# Patient Record
Sex: MALE | Race: WHITE | NOT HISPANIC OR LATINO | Employment: FULL TIME | URBAN - METROPOLITAN AREA
[De-identification: names, ages, dates, MRNs, and addresses within clinical notes are randomized per-mention and may not be internally consistent; named-entity substitution may affect disease eponyms.]

---

## 2017-01-28 ENCOUNTER — PATIENT MESSAGE (OUTPATIENT)
Dept: RESEARCH | Facility: HOSPITAL | Age: 32
End: 2017-01-28

## 2019-05-16 ENCOUNTER — OFFICE VISIT (OUTPATIENT)
Dept: INTERNAL MEDICINE | Facility: CLINIC | Age: 34
End: 2019-05-16
Payer: COMMERCIAL

## 2019-05-16 VITALS
SYSTOLIC BLOOD PRESSURE: 118 MMHG | HEART RATE: 101 BPM | DIASTOLIC BLOOD PRESSURE: 80 MMHG | HEIGHT: 73 IN | BODY MASS INDEX: 26.38 KG/M2 | OXYGEN SATURATION: 99 % | WEIGHT: 199.06 LBS

## 2019-05-16 DIAGNOSIS — Z13.220 ENCOUNTER FOR LIPID SCREENING FOR CARDIOVASCULAR DISEASE: ICD-10-CM

## 2019-05-16 DIAGNOSIS — Z23 NEED FOR TETANUS, DIPHTHERIA, AND ACELLULAR PERTUSSIS (TDAP) VACCINE: ICD-10-CM

## 2019-05-16 DIAGNOSIS — H93.13 TINNITUS OF BOTH EARS: ICD-10-CM

## 2019-05-16 DIAGNOSIS — C44.519 BASAL CELL CARCINOMA (BCC) OF ANTERIOR CHEST: ICD-10-CM

## 2019-05-16 DIAGNOSIS — Z13.6 ENCOUNTER FOR LIPID SCREENING FOR CARDIOVASCULAR DISEASE: ICD-10-CM

## 2019-05-16 DIAGNOSIS — H43.393 VITREOUS FLOATERS OF BOTH EYES: ICD-10-CM

## 2019-05-16 DIAGNOSIS — J45.909 CHILDHOOD ASTHMA WITHOUT COMPLICATION, UNSPECIFIED ASTHMA SEVERITY, UNSPECIFIED WHETHER PERSISTENT: ICD-10-CM

## 2019-05-16 DIAGNOSIS — Z00.00 ANNUAL PHYSICAL EXAM: Primary | ICD-10-CM

## 2019-05-16 DIAGNOSIS — K21.9 GASTROESOPHAGEAL REFLUX DISEASE, ESOPHAGITIS PRESENCE NOT SPECIFIED: ICD-10-CM

## 2019-05-16 DIAGNOSIS — Z13.1 ENCOUNTER FOR SCREENING FOR DIABETES MELLITUS: ICD-10-CM

## 2019-05-16 DIAGNOSIS — Z13.29 SCREENING FOR THYROID DISORDER: ICD-10-CM

## 2019-05-16 PROCEDURE — 99999 PR PBB SHADOW E&M-EST. PATIENT-LVL III: ICD-10-PCS | Mod: PBBFAC,,, | Performed by: INTERNAL MEDICINE

## 2019-05-16 PROCEDURE — 99999 PR PBB SHADOW E&M-EST. PATIENT-LVL III: CPT | Mod: PBBFAC,,, | Performed by: INTERNAL MEDICINE

## 2019-05-16 PROCEDURE — 90715 TDAP VACCINE GREATER THAN OR EQUAL TO 7YO IM: ICD-10-PCS | Mod: S$GLB,,, | Performed by: INTERNAL MEDICINE

## 2019-05-16 PROCEDURE — 99385 PREV VISIT NEW AGE 18-39: CPT | Mod: 25,S$GLB,, | Performed by: INTERNAL MEDICINE

## 2019-05-16 PROCEDURE — 90471 IMMUNIZATION ADMIN: CPT | Mod: S$GLB,,, | Performed by: INTERNAL MEDICINE

## 2019-05-16 PROCEDURE — 90471 TDAP VACCINE GREATER THAN OR EQUAL TO 7YO IM: ICD-10-PCS | Mod: S$GLB,,, | Performed by: INTERNAL MEDICINE

## 2019-05-16 PROCEDURE — 99385 PR PREVENTIVE VISIT,NEW,18-39: ICD-10-PCS | Mod: 25,S$GLB,, | Performed by: INTERNAL MEDICINE

## 2019-05-16 PROCEDURE — 90715 TDAP VACCINE 7 YRS/> IM: CPT | Mod: S$GLB,,, | Performed by: INTERNAL MEDICINE

## 2019-05-16 NOTE — PROGRESS NOTES
Subjective:       Patient ID: Vimal Keys is a 33 y.o. male who  has a past medical history of Anxiety, Basal cell carcinoma (BCC) of anterior chest, Childhood asthma, Depression, and GERD (gastroesophageal reflux disease).    Chief Complaint: Establish Care     History was obtained from the patient and supplemented through chart review.  The patient has not seen a PCP in our system.  Reviewed admission for left inguinal hernia repair.    From [place].  Graduated in 2009.  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  Residency at [facility].  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  Wife is a pediatric gastroenterologist at [employer] (met in med school).    HPI    Non allergic rhinitis, tinnitus:  Viral infection that resulted in dimished sense of smell which is slowing coming back. Underwent nasal endoscopy 2016 without polyps.  Will see ENT for tinnitus.  No vertigo, falls.  Plans for audiogram.  On Ginko Biloba    Eyes Floaters :  Will see retina specialist.  S/p ppx laser.  Under consideration for victrectomy.    GERD:  Long history since about age 16.  On PPI intermittently.  Uncle with Vasquez's esophagus and adenocarcinoma of the esophagus.  EGD in 2015 with small hernia.  No esophagitis.  Pathology without Vasquez's/malignancy.  Sx are much better now.    Childhood asthma:  No acute issues.  Does not require rescue inhaler.    Basal cell Ca of anterior chest:  S/p imiquimod.  Follows with outside dermatology for annual exams.    Review of Systems   Constitutional: Negative for fever and unexpected weight change.   HENT: Negative for rhinorrhea and sneezing.    Eyes: Negative for redness and itching.   Respiratory: Negative for shortness of breath and wheezing.    Cardiovascular: Negative for chest pain and palpitations.   Gastrointestinal: Negative for abdominal pain and blood in stool.   Genitourinary: Negative for dysuria and hematuria.   Musculoskeletal: Negative for gait problem and joint swelling.   Skin:  Negative for color change and rash.   Neurological: Negative for dizziness and light-headedness.   Hematological: Negative for adenopathy.   Psychiatric/Behavioral: Negative for dysphoric mood. The patient is not nervous/anxious.        Past Medical History:   Diagnosis Date    Anxiety     not often    Basal cell carcinoma (BCC) of anterior chest     Childhood asthma     Depression     GERD (gastroesophageal reflux disease)      Past Surgical History:   Procedure Laterality Date    ESOPHAGOGASTRODUODENOSCOPY (EGD) N/A 11/19/2015    [facility] (4TH FLR)    ITNWKADSESR-PEKDPDAW-EPOCKJSDXMKZ-RIGHT Right 12/2/2016    [facility] Bruce [facility] at [facility] OR 2ND FLR    eye procedure      HERNIA REPAIR  2016    Lap LI with mesh    REPAIR-HERNIA-LAPAROSCOPIC-INGUINAL-LEFT w/ mesh Left 12/2/2016    Performed by [facility] at [facility] OR 2ND FLR     Family History   Problem Relation Age of Onset    Esophageal cancer Paternal Uncle         Adenocarcinoma    Vasquez's esophagus Paternal Uncle     No Known Problems Mother     Other Father         prediabetes    Heart attack Neg Hx     Diabetes Neg Hx     Hypertension Neg Hx      Social History     Socioeconomic History    Marital status:      Spouse name: Not on file    Number of children: Not on file    Years of education: Not on file    Highest education level: Not on file   Occupational History    Not on file   Social Needs    Financial resource strain: Not on file    Food insecurity:     Worry: Not on file     Inability: Not on file    Transportation needs:     Medical: Not on file     Non-medical: Not on file   Tobacco Use    Smoking status: Never Smoker    Smokeless tobacco: Never Used   Substance and Sexual Activity    Alcohol use: Yes     Alcohol/week: 5.4 oz     Types: 3 Glasses of wine, 3 Cans of beer, 3 Shots of liquor per week     Comment: social    Drug use: No    Sexual  "activity: Not on file   Lifestyle    Physical activity:     Days per week: Not on file     Minutes per session: Not on file    Stress: Not on file   Relationships    Social connections:     Talks on phone: Not on file     Gets together: Not on file     Attends [employer] service: Not on file     Active member of club or organization: Not on file     Attends meetings of clubs or organizations: Not on file     Relationship status: Not on file   Other Topics Concern    Not on file   Social History Narrative    Not on file     Objective:      Vitals:    05/16/19 1340   BP: 118/80   Pulse: 101   SpO2: 99%   Weight: 90.3 kg (199 lb 1.2 oz)   Height: 6' 1" (1.854 m)      Physical Exam   Constitutional: He appears well-developed and well-nourished. No distress.   HENT:   Head: Normocephalic and atraumatic.   Nose: Nose normal.   Mouth/Throat: Oropharynx is clear and moist. No oropharyngeal exudate.   Eyes: Pupils are equal, round, and reactive to light. Conjunctivae and EOM are normal. Right eye exhibits no discharge. Left eye exhibits no discharge. No scleral icterus.   Neck: Neck supple. No tracheal deviation present. No thyromegaly present.   Cardiovascular: Normal rate, regular rhythm, normal heart sounds and intact distal pulses.   No murmur heard.  Pulmonary/Chest: Effort normal and breath sounds normal. No respiratory distress. He has no wheezes.   Abdominal: Soft. Bowel sounds are normal. He exhibits no distension. There is no tenderness.   Musculoskeletal: He exhibits no edema or deformity.   Lymphadenopathy:     He has no cervical adenopathy.   Neurological: He is alert. No cranial nerve deficit. Gait normal.   Skin: Skin is warm and dry. Capillary refill takes less than 2 seconds. He is not diaphoretic. No erythema.   Psychiatric: He has a normal mood and affect. His behavior is normal.         Lab Results   Component Value Date    WBC 5.22 11/30/2016    HGB 13.8 (L) 11/30/2016    HCT 41.8 11/30/2016    PLT " 217 11/30/2016     11/30/2016    K 4.1 11/30/2016     11/30/2016    CREATININE 1.1 11/30/2016    BUN 15 11/30/2016    CO2 27 11/30/2016       The ASCVD Risk score (Keisha TOSCANO Jr., et al., 2013) failed to calculate for the following reasons:    The 2013 ASCVD risk score is only valid for ages 40 to 79    (Imaging have been independently reviewed)  none    Assessment:       1. Annual physical exam    2. Tinnitus of both ears    3. Vitreous floaters of both eyes    4. Gastroesophageal reflux disease, esophagitis presence not specified    5. Childhood asthma without complication, unspecified asthma severity, unspecified whether persistent    6. Basal cell carcinoma (BCC) of anterior chest    7. Encounter for lipid screening for cardiovascular disease    8. Encounter for screening for diabetes mellitus    9. Screening for thyroid disorder    10. Need for tetanus, diphtheria, and acellular pertussis (Tdap) vaccine          Plan:       Vimal was seen today for establish care.    Diagnoses and all orders for this visit:    Annual physical exam  -     CBC auto differential; Future  -     Comprehensive metabolic panel; Future    Tinnitus of both ears  Comments:  Will see ENT with plans for audiogram.    Vitreous floaters of both eyes  Comments:  s/p laser for ppx.  Will see retina specialist.    Gastroesophageal reflux disease, esophagitis presence not specified  Comments:  EGD 2015 without esophagitis, pathology without malignancy or Vasquez's.  On PPI intermittently with improvement of symptoms.    Childhood asthma without complication, unspecified asthma severity, unspecified whether persistent  Comments:  No acute issues.    Basal cell carcinoma (BCC) of anterior chest  Comments:  Follows with Dermatology for routine skin exams.    Encounter for lipid screening for cardiovascular disease  -     Lipid panel; Future    Encounter for screening for diabetes mellitus  -     Hemoglobin A1c; Future    Screening for  thyroid disorder  -     TSH; Future    Need for tetanus, diphtheria, and acellular pertussis (Tdap) vaccine  -     (In Office Administered) Tdap Vaccine         Side effects of medication(s) were discussed in detail and patient voiced understanding.  Patient will call back for any issues or complications.     RTC in 1 year(s) or sooner PRN.

## 2019-05-16 NOTE — PROGRESS NOTES
"Patient was given vaccine information sheet for the Tdap immunization. The area of injection was palpated using the acromion process as a landmark. This area was cleaned with alcohol. Using a 25g 1" safety needle, 0.5mL of the vaccine was placed into the right deltoid muscle. The injection site was dressed with a bandage. Patient experienced no complications and was discharged in stable condition. Tdap Lot: [id number] Exp: [id number]    "

## 2019-06-11 ENCOUNTER — TELEPHONE (OUTPATIENT)
Dept: OTOLARYNGOLOGY | Facility: CLINIC | Age: 34
End: 2019-06-11

## 2019-06-12 ENCOUNTER — OFFICE VISIT (OUTPATIENT)
Dept: OTOLARYNGOLOGY | Facility: CLINIC | Age: 34
End: 2019-06-12
Payer: COMMERCIAL

## 2019-06-12 ENCOUNTER — CLINICAL SUPPORT (OUTPATIENT)
Dept: AUDIOLOGY | Facility: CLINIC | Age: 34
End: 2019-06-12
Payer: COMMERCIAL

## 2019-06-12 VITALS — HEIGHT: 73 IN | BODY MASS INDEX: 26.71 KG/M2 | WEIGHT: 201.5 LBS

## 2019-06-12 DIAGNOSIS — H93.13 TINNITUS OF BOTH EARS: Primary | ICD-10-CM

## 2019-06-12 DIAGNOSIS — H93.13 SUBJECTIVE TINNITUS OF BOTH EARS: ICD-10-CM

## 2019-06-12 DIAGNOSIS — H93.13 TINNITUS, BILATERAL: Primary | ICD-10-CM

## 2019-06-12 PROCEDURE — 92557 PR COMPREHENSIVE HEARING TEST: ICD-10-PCS | Mod: S$GLB,,, | Performed by: AUDIOLOGIST

## 2019-06-12 PROCEDURE — 92567 TYMPANOMETRY: CPT | Mod: S$GLB,,, | Performed by: AUDIOLOGIST

## 2019-06-12 PROCEDURE — 99999 PR PBB SHADOW E&M-EST. PATIENT-LVL II: ICD-10-PCS | Mod: PBBFAC,,, | Performed by: OTOLARYNGOLOGY

## 2019-06-12 PROCEDURE — 92567 PR TYMPA2METRY: ICD-10-PCS | Mod: S$GLB,,, | Performed by: AUDIOLOGIST

## 2019-06-12 PROCEDURE — 92557 COMPREHENSIVE HEARING TEST: CPT | Mod: S$GLB,,, | Performed by: AUDIOLOGIST

## 2019-06-12 PROCEDURE — 99214 OFFICE O/P EST MOD 30 MIN: CPT | Mod: S$GLB,,, | Performed by: OTOLARYNGOLOGY

## 2019-06-12 PROCEDURE — 3008F PR BODY MASS INDEX (BMI) DOCUMENTED: ICD-10-PCS | Mod: CPTII,S$GLB,, | Performed by: OTOLARYNGOLOGY

## 2019-06-12 PROCEDURE — 99214 PR OFFICE/OUTPT VISIT, EST, LEVL IV, 30-39 MIN: ICD-10-PCS | Mod: S$GLB,,, | Performed by: OTOLARYNGOLOGY

## 2019-06-12 PROCEDURE — 3008F BODY MASS INDEX DOCD: CPT | Mod: CPTII,S$GLB,, | Performed by: OTOLARYNGOLOGY

## 2019-06-12 PROCEDURE — 99999 PR PBB SHADOW E&M-EST. PATIENT-LVL II: CPT | Mod: PBBFAC,,, | Performed by: OTOLARYNGOLOGY

## 2019-06-12 RX ORDER — ALPRAZOLAM 0.5 MG/1
0.5 TABLET ORAL 3 TIMES DAILY
Qty: 30 TABLET | Refills: 3 | Status: SHIPPED | OUTPATIENT
Start: 2019-06-12 | End: 2020-10-07

## 2019-06-12 NOTE — PROGRESS NOTES
Vimal Keys was seen today for a hearing evaluation.      Pure tone audiometry revealed normal hearing, AU  SRT and PTA are in good agreement bilaterally.  Excellent speech discrimination scores bilaterally   Tympanometry revealed Type A (normal middle ear function), bilaterally      Recommendations:  1. Otologic Evaluation  2. Repeat audiogram as needed  3. Hearing Protection

## 2019-06-12 NOTE — PROGRESS NOTES
Subjective:       Patient ID: Vimal Keys is a 33 y.o. male.    Chief Complaint: No chief complaint on file.    HPI   Here for tinnitus eval, present for ~10 years, bilateral, symmetric, non-pulsatile, mod disabling, exacerbated recently by loud noise exposure, more noticeable in quiet settings. Using lipoflavinoids OTC and masking strategies (I.e. White noise machine).    He currently denies any other otologic stx including hearing loss, otalgia, otorrhea, vertigo, facial nn weakness.    No h/o ear infections, ear surgery, head trauma, loud noise exposure.     No FH hearing loss, vertigo, migraines.    Profession: Radiologist.      Review of Systems    Review of Systems reviewed including    CONSTITUTIONAL: no fevers, chills, weight loss, night sweats  EYES: no diplopia, no blurry vision  ENT: as above   NEURO: no motor or sensory deficits  CV: no CP, no palpitations  PULM: no cough, no SOB, no wheezing   GI: no abd pain, no constipation/diarrhea  : no dysuria, no hematuria  MSK: no bone/joint pain  HEM: no bruising or bleeding   PSYCH - no depression, no anxiety    Past Medical History:   Diagnosis Date    Anxiety     not often    Basal cell carcinoma (BCC) of anterior chest     Childhood asthma     Depression     GERD (gastroesophageal reflux disease)          Current Outpatient Medications:     GINKGO BILOBA ORAL, Take by mouth., Disp: , Rfl:     Past Surgical History:   Procedure Laterality Date    ESOPHAGOGASTRODUODENOSCOPY (EGD) N/A 11/19/2015    [facility]O (4TH FLR)    GCQJZDZAIEN-HLUKXJKS-RFSWAQOSCRWF-RIGHT Right 12/2/2016    [facility] Bruce [facility] at [facility] OR 2ND FLR    eye procedure      HERNIA REPAIR  2016    [facility] with mesh    REPAIR-HERNIA-LAPAROSCOPIC-INGUINAL-LEFT w/ mesh Left 12/2/2016    Performed by Bruce [facility] MD at [facility] OR 2ND FLR       Social History     Tobacco Use    Smoking status: Never Smoker    Smokeless  tobacco: Never Used   Substance Use Topics    Alcohol use: Yes     Alcohol/week: 5.4 oz     Types: 3 Glasses of wine, 3 Cans of beer, 3 Shots of liquor per week     Comment: social    Drug use: No       Family History   Problem Relation Age of Onset    Esophageal cancer Paternal Uncle         Adenocarcinoma    Vasquez's esophagus Paternal Uncle     No Known Problems Mother     Other Father         prediabetes    Heart attack Neg Hx     Diabetes Neg Hx     Hypertension Neg Hx        Review of patient's allergies indicates:  No Known Allergies      Objective:      Physical Exam    General: NAD; Well appearing  Neuro: AAOx3. CN II-XII intact.  Cardiovascular: normal rate  Respiratory: No labored breathing, no stridor  Head/Face: Normal inspection  Eyes: EOMI; PERRLA     Ears examined under Microscope  Right Ear: Auricle WNL. EAC WNL. TM normal.      Left Ear: Auricle WNL. EAC WNL. TM normal.    Nose: normal ext appearance and palpation.  OC/OP: wnl  Neck/Lymphatic: No LAD.          Assessment:       1. Tinnitus of both ears        Plan:         Discussed with patient multiple tinnitus management strategies including the use of maskers, instruments, background sound enrichment and other means. All questions answered and appropriate references given.    Diagnoses and all orders for this visit:    Tinnitus of both ears    Subjective tinnitus of both ears    Other orders  -     ALPRAZolam (XANAX) 0.5 MG tablet; Take 1 tablet (0.5 mg total) by mouth 3 (three) times daily.      Hearing protection.       RTC prn

## 2020-04-24 ENCOUNTER — LAB VISIT (OUTPATIENT)
Dept: LAB | Facility: HOSPITAL | Age: 35
End: 2020-04-24
Attending: FAMILY MEDICINE
Payer: COMMERCIAL

## 2020-04-24 DIAGNOSIS — Z01.84 IMMUNITY STATUS TESTING: Primary | ICD-10-CM

## 2020-04-24 DIAGNOSIS — Z01.84 IMMUNITY STATUS TESTING: ICD-10-CM

## 2020-04-24 PROCEDURE — 86769 SARS-COV-2 COVID-19 ANTIBODY: CPT

## 2020-04-24 PROCEDURE — 36415 COLL VENOUS BLD VENIPUNCTURE: CPT

## 2020-04-25 LAB — SARS-COV-2 IGG SERPL QL IA: NEGATIVE

## 2020-06-12 ENCOUNTER — LAB VISIT (OUTPATIENT)
Dept: INTERNAL MEDICINE | Facility: CLINIC | Age: 35
End: 2020-06-12
Payer: COMMERCIAL

## 2020-06-12 PROCEDURE — U0003 INFECTIOUS AGENT DETECTION BY NUCLEIC ACID (DNA OR RNA); SEVERE ACUTE RESPIRATORY SYNDROME CORONAVIRUS 2 (SARS-COV-2) (CORONAVIRUS DISEASE [COVID-19]), AMPLIFIED PROBE TECHNIQUE, MAKING USE OF HIGH THROUGHPUT TECHNOLOGIES AS DESCRIBED BY CMS-2020-01-R: HCPCS

## 2020-06-14 LAB — SARS-COV-2 RNA RESP QL NAA+PROBE: NOT DETECTED

## 2020-11-02 DIAGNOSIS — R11.0 NAUSEA: ICD-10-CM

## 2020-11-03 DIAGNOSIS — Z01.84 ENCOUNTER FOR ANTIBODY RESPONSE EXAMINATION: ICD-10-CM

## 2021-04-26 ENCOUNTER — PATIENT MESSAGE (OUTPATIENT)
Dept: RESEARCH | Facility: HOSPITAL | Age: 36
End: 2021-04-26

## 2021-07-07 ENCOUNTER — PATIENT MESSAGE (OUTPATIENT)
Dept: ADMINISTRATIVE | Facility: HOSPITAL | Age: 36
End: 2021-07-07

## 2021-08-20 ENCOUNTER — IMMUNIZATION (OUTPATIENT)
Dept: PHARMACY | Facility: CLINIC | Age: 36
End: 2021-08-20

## 2021-08-20 DIAGNOSIS — Z23 NEED FOR VACCINATION: Primary | ICD-10-CM

## 2021-10-04 ENCOUNTER — PATIENT MESSAGE (OUTPATIENT)
Dept: ADMINISTRATIVE | Facility: HOSPITAL | Age: 36
End: 2021-10-04

## 2022-06-24 ENCOUNTER — TELEPHONE (OUTPATIENT)
Dept: EMERGENCY MEDICINE | Facility: HOSPITAL | Age: 37
End: 2022-06-24

## 2022-06-24 ENCOUNTER — LAB VISIT (OUTPATIENT)
Dept: LAB | Facility: HOSPITAL | Age: 37
End: 2022-06-24
Attending: EMERGENCY MEDICINE
Payer: COMMERCIAL

## 2022-06-24 DIAGNOSIS — J06.9 UPPER RESPIRATORY TRACT INFECTION, UNSPECIFIED TYPE: ICD-10-CM

## 2022-06-24 DIAGNOSIS — J06.9 UPPER RESPIRATORY TRACT INFECTION, UNSPECIFIED TYPE: Primary | ICD-10-CM

## 2022-06-24 LAB
INFLUENZA A, MOLECULAR: NEGATIVE
INFLUENZA B, MOLECULAR: NEGATIVE
SPECIMEN SOURCE: NORMAL

## 2022-06-24 PROCEDURE — 87502 INFLUENZA DNA AMP PROBE: CPT | Performed by: EMERGENCY MEDICINE

## 2022-12-05 DIAGNOSIS — K21.9 GASTROESOPHAGEAL REFLUX DISEASE, UNSPECIFIED WHETHER ESOPHAGITIS PRESENT: Primary | ICD-10-CM

## 2022-12-06 ENCOUNTER — TELEPHONE (OUTPATIENT)
Dept: ENDOSCOPY | Facility: HOSPITAL | Age: 37
End: 2022-12-06
Payer: COMMERCIAL

## 2022-12-06 VITALS — BODY MASS INDEX: 25.84 KG/M2 | HEIGHT: 73 IN | WEIGHT: 195 LBS

## 2022-12-06 DIAGNOSIS — K21.9 GASTROESOPHAGEAL REFLUX DISEASE, UNSPECIFIED WHETHER ESOPHAGITIS PRESENT: Primary | ICD-10-CM

## 2022-12-06 NOTE — TELEPHONE ENCOUNTER
[facility] Endo Schedulers  Caller: Unspecified (1 week ago)  Hi, this is Tony Robertos, .   Can someone please call him to schedule an EGD.   Thank you!   Flakita           Previous Messages     ----- Message -----   From: Celio Keys MD   Sent: 12/5/2022   3:21 PM CST   To: Laquita Guadalupe MA     Noproblem, order placed   ----- Message -----   From: Laquita Guadalupe MA   Sent: 12/1/2022   4:25 PM CST   To: Celio Keys MD     Spoke with patient. C/o dysphagia. His last EGD was 11/19/2015.  Your recommendation at that time was repeat in 3 years.   Flakita   ----- Message -----   From: Nichole Dey   Sent: 11/25/2022  11:18 AM CST   To: Massimo PETERSON Staff     Type:  Sooner Apoointment Request     Caller is requesting a sooner appointment.  Caller declined first available appointment listed below.  Caller will not accept being placed on the waitlist and is requesting a message be sent to doctor.   Name of Caller:DURAN KEYS [[id number]]   When is the first available appointment?none   Symptoms: Dysphagia   Would the patient rather a call back or a response via [facility]? Either   Best Call Back Number:[phone]

## 2022-12-06 NOTE — PLAN OF CARE
Called and spoke to patient. EGD scheduled. Instructions reviewed and verbalized. 1/24/23 with an arrival time of 7:40 AM confirmed. Pt verbalized understanding. Instructions e-mailed patient's request.

## 2023-01-19 ENCOUNTER — OFFICE VISIT (OUTPATIENT)
Dept: UROLOGY | Facility: CLINIC | Age: 38
End: 2023-01-19
Payer: COMMERCIAL

## 2023-01-19 VITALS
HEART RATE: 87 BPM | WEIGHT: 187.38 LBS | SYSTOLIC BLOOD PRESSURE: 106 MMHG | HEIGHT: 73 IN | DIASTOLIC BLOOD PRESSURE: 72 MMHG | BODY MASS INDEX: 24.83 KG/M2

## 2023-01-19 DIAGNOSIS — Z30.2 ENCOUNTER FOR MALE STERILIZATION PROCEDURE: Primary | ICD-10-CM

## 2023-01-19 PROCEDURE — 3074F SYST BP LT 130 MM HG: CPT | Mod: CPTII,S$GLB,, | Performed by: UROLOGY

## 2023-01-19 PROCEDURE — 99204 OFFICE O/P NEW MOD 45 MIN: CPT | Mod: S$GLB,,, | Performed by: UROLOGY

## 2023-01-19 PROCEDURE — 1159F PR MEDICATION LIST DOCUMENTED IN MEDICAL RECORD: ICD-10-PCS | Mod: CPTII,S$GLB,, | Performed by: UROLOGY

## 2023-01-19 PROCEDURE — 99999 PR PBB SHADOW E&M-EST. PATIENT-LVL III: CPT | Mod: PBBFAC,,, | Performed by: UROLOGY

## 2023-01-19 PROCEDURE — 3008F BODY MASS INDEX DOCD: CPT | Mod: CPTII,S$GLB,, | Performed by: UROLOGY

## 2023-01-19 PROCEDURE — 3078F PR MOST RECENT DIASTOLIC BLOOD PRESSURE < 80 MM HG: ICD-10-PCS | Mod: CPTII,S$GLB,, | Performed by: UROLOGY

## 2023-01-19 PROCEDURE — 3078F DIAST BP <80 MM HG: CPT | Mod: CPTII,S$GLB,, | Performed by: UROLOGY

## 2023-01-19 PROCEDURE — 1159F MED LIST DOCD IN RCRD: CPT | Mod: CPTII,S$GLB,, | Performed by: UROLOGY

## 2023-01-19 PROCEDURE — 99999 PR PBB SHADOW E&M-EST. PATIENT-LVL III: ICD-10-PCS | Mod: PBBFAC,,, | Performed by: UROLOGY

## 2023-01-19 PROCEDURE — 99204 PR OFFICE/OUTPT VISIT, NEW, LEVL IV, 45-59 MIN: ICD-10-PCS | Mod: S$GLB,,, | Performed by: UROLOGY

## 2023-01-19 PROCEDURE — 3008F PR BODY MASS INDEX (BMI) DOCUMENTED: ICD-10-PCS | Mod: CPTII,S$GLB,, | Performed by: UROLOGY

## 2023-01-19 PROCEDURE — 3074F PR MOST RECENT SYSTOLIC BLOOD PRESSURE < 130 MM HG: ICD-10-PCS | Mod: CPTII,S$GLB,, | Performed by: UROLOGY

## 2023-01-19 RX ORDER — LIDOCAINE HYDROCHLORIDE 10 MG/ML
20 INJECTION INFILTRATION; PERINEURAL ONCE
Status: COMPLETED | OUTPATIENT
Start: 2023-02-18 | End: 2023-03-17

## 2023-01-19 NOTE — PROGRESS NOTES
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 He has 3 children, ages 7, 4, and < 2 yo, and he is certain that he desires no more. He is aware of other forms of contraception.  He's currently using the pill for contraception. He is aware that vasectomy is to be considered permanent/irreversible.    He denies orchalgia.  No dysuria.  No hematuria.    ROS:  Vimal Keys denies headache, blurred vision, fever, nausea, vomiting, chills, abdominal pain, chest pain, sore throat, bleeding per rectum, cough, SOB, recent loss of consciousness, recent mental status changes, seizures, dizziness, or upper or lower extremity weakness.    Past Medical History    Past Medical History:   Diagnosis Date    Anxiety     not often    Basal cell carcinoma (BCC) of anterior chest     Childhood asthma     Childhood    Depression     GERD (gastroesophageal reflux disease)        Past Surgical History    Past Surgical History:   Procedure Laterality Date    eye procedure      HERNIA REPAIR  2016    Lap LIH with mesh       Social History    Social History     Socioeconomic History    Marital status:    Tobacco Use    Smoking status: Never    Smokeless tobacco: Never   Substance and Sexual Activity    Alcohol use: Yes     Alcohol/week: 9.0 standard drinks     Types: 3 Glasses of wine, 3 Cans of beer, 3 Shots of liquor per week     Comment: social    Drug use: Never    Sexual activity: Yes     Partners: Female       Family History  Family History   Problem Relation Age of Onset    Esophageal cancer Paternal Uncle         Adenocarcinoma    Vasquez's esophagus Paternal Uncle     No Known Problems Mother     Other Father         prediabetes    Heart attack Neg Hx     Diabetes Neg Hx     Hypertension Neg Hx          Medications  No current outpatient medications on file.    Allergies  Review of patient's allergies  indicates:  No Known Allergies      ?  PHYSICAL EXAM:    The patient generally appears in good health, is appropriately interactive, and is in no apparent distress.     Eyes: anicteric sclerae, moist conjunctivae; no lid-lag; PERRLA     HENT: Atraumatic; oropharynx clear with moist mucous membranes and no mucosal ulcerations;normal hard and soft palate.  No evidence of lymphadenopathy.    Neck: Trachea midline.  No thyromegaly.    Skin: No lesions.    Mental: Cooperative with normal affect.  Is oriented to time, place, and person.    Neuro: Grossly intact.    Chest: Normal inspiratory effort.   No accessory muscles.  No audible wheezes.  Respirations symmetric on inspiration and expiration.    Heart: Regular rhythm.      Abdomen:  Soft, non-tender. No masses or organomegaly. Bladder is not palpable. No evidence of flank discomfort. No evidence of inguinal hernia.    Genitourinary: The penis has no evidence of plaques or induration. The urethral meatus is normal. The testes, epididymides, and cord structures are normal in size and contour bilaterally. The scrotum is normal in size and contour.    Extremities: No clubbing, cyanosis, or edema          A/P:  Vimal Keys is a 37 y.o. male who presents for evaluation re vasectomy.    1.I discussed with the patient risks and benefits, as well as alternatives. We discussed possible complications at length, including fever, infection, bleeding--possibly requiring reoperation,testicular injury or atrophy with loss of function, chronic pain, persistent or recurrent presence of sperm in the ejaculate, vasal recanalization, as well as the risks attendant to the anesthetic.  2.We discussed that he should stop aspirin, ibuprofen, and similar products at least one week prior to the procedure. Acetominophen is okay to use for headaches, pain, etc.  3. He should bring an athletic supporter and loose fitting sweat pants on the day of the procedure.  4. We discussed that he  must continue to use contraception until two consecutive semen analyses (checked at approximately 4-6 weeks post-vasectomy) reveal azoospermia.  5. He will schedule an elective vasectomy.

## 2023-01-19 NOTE — PATIENT INSTRUCTIONS
Having a Vasectomy: Before, During, and After the Procedure  Vasectomy is an outpatient (same day) procedure. It can be done in a doctors office, clinic, or hospital. Your doctor will talk with you about preparing for surgery. He or she will also discuss the possible risks and complications with you. After the procedure, follow all your doctors advice for recovery.  Preparing for Surgery      The cut ends of the vas may be tied, closed with a clip, or sealed by heat (cauterized).    Your doctor will talk with you about getting ready for surgery. You may be asked to do the following:  Sign a consent form. This must be done at least a few days before surgery. It gives your doctor permission to do the procedure. It also states that a vasectomy is not guaranteed to make you sterile.  Dont take aspirin, ibuprofen, or naproxen for 1 week before surgery or 1 week after. These medications can cause bleeding after the procedure. Also, tell your doctor if you take any medications, supplements, or herbal remedies.  Tell your doctor if youve had any prior scrotal surgery.  Arrange for an adult family member or friend to give you a ride home after surgery.  Shower and clean your scrotum the day of surgery. Your doctor may also ask you to shave your scrotum.  Bring an athletic supporter (jockstrap) and a pair of loose fitting pants to the doctors office or hospital.  Eat something with sugar before surgery.  During Surgery  The entire procedure usually lasts less than 30 minutes.  Youll be asked to undress and lie on a table.  To prevent pain during surgery, youll be given an injection of local anesthetic in your scrotum or lower groin.  Once the area is numb, one or two small incisions are made in the scrotum.   The vas deferens are lifted through the incision and cut. The ends of the vas are then sealed off using one of several methods.  If needed, the incision is closed with stitches.  You can rest for a while until  youre ready to go home.  Recovering at Home  For about a week, your scrotum may look bruised and slightly swollen. You may also have a small amount of bloody discharge from the incision. This is normal.  To help make your recovery more comfortable, follow the tips below.  Stay off your feet as much as possible for the first 2 days.   Wear an athletic supporter or snug cotton briefs for support.  Ice the area for 24 hours  Take medications with acetaminophen (such as Tylenol) to relieve any discomfort. Dont use aspirin, ibuprofen, or naproxen.  Avoid heavy lifting, exercise, or intercourse for 7 days.  Remember: You must use another form of birth control until youre completely sterile.  Call your doctor if you notice any of the following after surgery:   Increasing pain or swelling in your scrotum  A large black-and-blue area, or a growing lump  Fever or chills  Increasing redness or drainage of the incision  Trouble urinating    Sex After Vasectomy  Vasectomy doesnt change your sexual function. So when you start having sex again, it should feel the same as before. A vasectomy also shouldnt affect your relationship with your partner. Its important to remember, though, that you wont become sterile right away. It will take time before you can have sex without the need for birth control.  Until youre sterile: After a vasectomy, some active sperm still remain in your semen. It will take time and many ejaculations before the sperm are completely gone. During this period, you must use another birth control method to prevent pregnancy. To make sure no sperm are left in your semen, youll need to have one or more semen exams. You usually collect a semen sample at home and bring it to a lab. The sample is then checked under a microscope. Youre sterile only when these samples show no evidence of sperm. Ask your doctor whether additional follow-up is needed.  After youre sterile: After your doctor tells you youre  sterile, you no longer need to use any form of birth control. Youre free to have sex without the fear of unwanted pregnancy. However, a vasectomy does not protect you from sexually transmitted diseases (STDs). If you have more than one sex partner, be sure to practice safer sex by using condoms.  © 7296-0731 [employer], [address] [place] [address], [place] [address]. All rights reserved. This information is not intended as a substitute for professional medical care. Always follow your healthcare professional's instructions.    Vasectomy: Risks and Complications  A vasectomy is an outpatient procedure. This means youll go home the same day. Its done in a doctors office, clinic, or hospital. Before your procedure, youll be asked to read and sign a consent form. This form states youre aware of the possible risks and complications. It also says that you understand that the procedure, though most often successful, cant promise to make you sterile. Be sure you have all your questions answered before you sign this form. Below is a list of risks and possible complications of the procedure.  Risks and Possible Complications of Vasectomy   Vasectomy is safe. But it does have risks. They include the following:  Bleeding or infection.  Sperm granuloma. This is a small, harmless lump. It may form where the vas deferens is sealed off.  Loss of Testicle  Epididymitis.This is inflammation that may cause scrotal aching. It often goes away without treatment. Anti-inflammatory medications can provide relief.  Reconnection of the vas deferens. This can occur in rare cases. It makes you fertile again. This can result in an unwanted pregnancy.  Sperm antibodies.These are a common response of the body to absorbed sperm. The antibodies can make you sterile. This is true even if you later try to reverse your vasectomy.  Long-term testicular discomfort.This may occur after surgery. But its very rare.    © 2861-0305 [employer]hese,  [address] [place] [address]. All rights reserved. This information is not intended as a substitute for professional medical care. Always follow your healthcare professional's instructions.

## 2023-01-24 ENCOUNTER — ANESTHESIA EVENT (OUTPATIENT)
Dept: ENDOSCOPY | Facility: HOSPITAL | Age: 38
End: 2023-01-24
Payer: COMMERCIAL

## 2023-01-24 ENCOUNTER — TELEPHONE (OUTPATIENT)
Dept: UROLOGY | Facility: CLINIC | Age: 38
End: 2023-01-24
Payer: COMMERCIAL

## 2023-01-24 ENCOUNTER — ANESTHESIA (OUTPATIENT)
Dept: ENDOSCOPY | Facility: HOSPITAL | Age: 38
End: 2023-01-24
Payer: COMMERCIAL

## 2023-01-24 ENCOUNTER — HOSPITAL ENCOUNTER (OUTPATIENT)
Facility: HOSPITAL | Age: 38
Discharge: HOME | End: 2023-01-24
Attending: INTERNAL MEDICINE | Admitting: INTERNAL MEDICINE
Payer: COMMERCIAL

## 2023-01-24 VITALS
SYSTOLIC BLOOD PRESSURE: 105 MMHG | DIASTOLIC BLOOD PRESSURE: 70 MMHG | HEART RATE: 64 BPM | HEIGHT: 73 IN | TEMPERATURE: 98 F | BODY MASS INDEX: 24.52 KG/M2 | OXYGEN SATURATION: 100 % | RESPIRATION RATE: 16 BRPM | WEIGHT: 185 LBS

## 2023-01-24 DIAGNOSIS — K21.9 GERD (GASTROESOPHAGEAL REFLUX DISEASE): ICD-10-CM

## 2023-01-24 PROCEDURE — 88312 PR  SPECIAL STAINS,GROUP I: ICD-10-PCS | Mod: 26,,, | Performed by: PATHOLOGY

## 2023-01-24 PROCEDURE — 37000008 HC ANESTHESIA 1ST 15 MINUTES: Performed by: INTERNAL MEDICINE

## 2023-01-24 PROCEDURE — 88305 TISSUE EXAM BY PATHOLOGIST: ICD-10-PCS | Mod: 26,,, | Performed by: PATHOLOGY

## 2023-01-24 PROCEDURE — E9220 PRA ENDO ANESTHESIA: ICD-10-PCS | Mod: ,,, | Performed by: NURSE ANESTHETIST, CERTIFIED REGISTERED

## 2023-01-24 PROCEDURE — 88305 TISSUE EXAM BY PATHOLOGIST: CPT | Mod: 26,,, | Performed by: PATHOLOGY

## 2023-01-24 PROCEDURE — 37000009 HC ANESTHESIA EA ADD 15 MINS: Performed by: INTERNAL MEDICINE

## 2023-01-24 PROCEDURE — 88305 TISSUE EXAM BY PATHOLOGIST: CPT | Performed by: PATHOLOGY

## 2023-01-24 PROCEDURE — 27201012 HC FORCEPS, HOT/COLD, DISP: Performed by: INTERNAL MEDICINE

## 2023-01-24 PROCEDURE — E9220 PRA ENDO ANESTHESIA: HCPCS | Mod: ,,, | Performed by: NURSE ANESTHETIST, CERTIFIED REGISTERED

## 2023-01-24 PROCEDURE — 25000003 PHARM REV CODE 250: Performed by: INTERNAL MEDICINE

## 2023-01-24 PROCEDURE — 43239 PR EGD, FLEX, W/BIOPSY, SGL/MULTI: ICD-10-PCS | Mod: ,,, | Performed by: INTERNAL MEDICINE

## 2023-01-24 PROCEDURE — 43239 EGD BIOPSY SINGLE/MULTIPLE: CPT | Performed by: INTERNAL MEDICINE

## 2023-01-24 PROCEDURE — 63600175 PHARM REV CODE 636 W HCPCS: Performed by: NURSE ANESTHETIST, CERTIFIED REGISTERED

## 2023-01-24 PROCEDURE — 88312 SPECIAL STAINS GROUP 1: CPT | Performed by: PATHOLOGY

## 2023-01-24 PROCEDURE — 43239 EGD BIOPSY SINGLE/MULTIPLE: CPT | Mod: ,,, | Performed by: INTERNAL MEDICINE

## 2023-01-24 PROCEDURE — 88312 SPECIAL STAINS GROUP 1: CPT | Mod: 26,,, | Performed by: PATHOLOGY

## 2023-01-24 RX ORDER — PROPOFOL 10 MG/ML
VIAL (ML) INTRAVENOUS
Status: DISCONTINUED | OUTPATIENT
Start: 2023-01-24 | End: 2023-01-24

## 2023-01-24 RX ORDER — SODIUM CHLORIDE 9 MG/ML
INJECTION, SOLUTION INTRAVENOUS CONTINUOUS
Status: CANCELLED | OUTPATIENT
Start: 2023-01-24

## 2023-01-24 RX ORDER — SODIUM CHLORIDE 9 MG/ML
INJECTION, SOLUTION INTRAVENOUS CONTINUOUS
Status: DISCONTINUED | OUTPATIENT
Start: 2023-01-24 | End: 2023-01-24 | Stop reason: HOSPADM

## 2023-01-24 RX ORDER — LIDOCAINE HYDROCHLORIDE 20 MG/ML
INJECTION, SOLUTION EPIDURAL; INFILTRATION; INTRACAUDAL; PERINEURAL
Status: DISCONTINUED | OUTPATIENT
Start: 2023-01-24 | End: 2023-01-24

## 2023-01-24 RX ADMIN — PROPOFOL 100 MG: 10 INJECTION, EMULSION INTRAVENOUS at 08:01

## 2023-01-24 RX ADMIN — Medication 250 MG: at 08:01

## 2023-01-24 RX ADMIN — SODIUM CHLORIDE: 0.9 INJECTION, SOLUTION INTRAVENOUS at 08:01

## 2023-01-24 RX ADMIN — Medication 50 MG: at 08:01

## 2023-01-24 RX ADMIN — LIDOCAINE HYDROCHLORIDE 100 MG: 20 INJECTION, SOLUTION EPIDURAL; INFILTRATION; INTRACAUDAL; PERINEURAL at 08:01

## 2023-01-24 NOTE — H&P
[facility] 4th Floor  Gastroenterology  H&P    Patient Name: Vimal Kumari  MRN: [id number]  Admission Date: 1/24/2023  Code Status: No Order    Attending Provider: sherman kumari  Primary Care Physician: Haleigh Matos MD  Principal Problem:<principal problem not specified>    Subjective:     History of Present Illness: reflux and dysphgai    Past Medical History:   Diagnosis Date    Anxiety     not often    Basal cell carcinoma (BCC) of anterior chest     Childhood asthma     Childhood    Depression     GERD (gastroesophageal reflux disease)        Past Surgical History:   Procedure Laterality Date    eye procedure      HERNIA REPAIR  2016    Lap LIH with mesh       Review of patient's allergies indicates:  No Known Allergies  Family History       Problem Relation (Age of Onset)    Vasquez's esophagus Paternal Uncle    Esophageal cancer Paternal Uncle    No Known Problems Mother    Other Father          Tobacco Use    Smoking status: Never    Smokeless tobacco: Never   Substance and Sexual Activity    Alcohol use: Yes     Alcohol/week: 9.0 standard drinks     Types: 3 Glasses of wine, 3 Cans of beer, 3 Shots of liquor per week     Comment: social    Drug use: Never    Sexual activity: Yes     Partners: Female     Review of Systems   Respiratory: Negative.     Cardiovascular: Negative.    Objective:     Vital Signs (Most Recent):  Temp: 98.2 °F (36.8 °C) (01/24/23 0747)  Pulse: 67 (01/24/23 0747)  Resp: 15 (01/24/23 0747)  BP: 122/72 (01/24/23 0747)  SpO2: 100 % (01/24/23 0747) Vital Signs (24h Range):  Temp:  [98.2 °F (36.8 °C)] 98.2 °F (36.8 °C)  Pulse:  [67] 67  Resp:  [15] 15  SpO2:  [100 %] 100 %  BP: (122)/(72) 122/72     Weight: 83.9 kg (185 lb) (01/24/23 0747)  Body mass index is 24.41 kg/m².    No intake or output data in the 24 hours ending 01/24/23 0836    Lines/Drains/Airways       Peripheral Intravenous Line  Duration                  Peripheral IV - Single Lumen 11/19/15 1251 Right Hand  2622 days         Peripheral IV - Single Lumen 01/24/23 0749 20 G Anterior;Right Hand <1 day                    Physical Exam  Cardiovascular:      Rate and Rhythm: Normal rate and regular rhythm.   Pulmonary:      Effort: Pulmonary effort is normal.      Breath sounds: Normal breath sounds.       Significant Labs:      Significant Imaging:      Assessment/Plan:     There are no hospital problems to display for this patient.      Indication for procedure:    ASA:II  Airway normal  Malampati class:    Personal and family history negative for anesthesia problems    Plan:egd  Anesthesia plan: general      Celio Keys MD  Gastroenterology  [facility] 4th [facility]

## 2023-01-24 NOTE — ANESTHESIA POSTPROCEDURE EVALUATION
Anesthesia Post Evaluation    Patient: Vimal Keys    Procedure(s) Performed: Procedure(s) (LRB):  EGD (ESOPHAGOGASTRODUODENOSCOPY) (N/A)    Final Anesthesia Type: general      Patient location during evaluation: PACU  Patient participation: Yes- Able to Participate  Level of consciousness: awake and alert  Post-procedure vital signs: reviewed and stable  Pain management: adequate  Airway patency: patent    PONV status at discharge: No PONV  Anesthetic complications: no      Cardiovascular status: blood pressure returned to baseline  Respiratory status: spontaneous ventilation and room air  Hydration status: euvolemic  Follow-up not needed.          Vitals Value Taken Time   /70 01/24/23 0928   Temp 36.7 °C (98.1 °F) 01/24/23 0900   Pulse 64 01/24/23 0928   Resp 16 01/24/23 0928   SpO2 100 % 01/24/23 0928         Event Time   Out of Recovery 09:35:29         Pain/Chad Score: Chad Score: 9 (1/24/2023  9:04 AM)

## 2023-01-24 NOTE — ANESTHESIA PREPROCEDURE EVALUATION
01/24/2023  Vimal Keys is a 37 y.o., male.  Past Medical History:   Diagnosis Date    Anxiety     not often    Basal cell carcinoma (BCC) of anterior chest     Childhood asthma     Childhood    Depression     GERD (gastroesophageal reflux disease)      Past Surgical History:   Procedure Laterality Date    eye procedure      HERNIA REPAIR  2016    Lap LIH with mesh         Pre-op Assessment    I have reviewed the Patient Summary Reports.     I have reviewed the Nursing Notes. I have reviewed the NPO Status.   I have reviewed the Medications.     Review of Systems  Anesthesia Hx:  No problems with previous Anesthesia    Social:  Non-Smoker, Social Alcohol Use    Pulmonary:   Asthma    Hepatic/GI:   GERD    Psych:   Psychiatric History          Physical Exam  General: Well nourished, Cooperative, Alert and Oriented    Airway:  Mallampati: II / II  Mouth Opening: Normal  TM Distance: Normal  Tongue: Normal  Neck ROM: Normal ROM    Dental:  Intact    Chest/Lungs:  Clear to auscultation, Normal Respiratory Rate    Heart:  Rate: Normal  Rhythm: Regular Rhythm        Anesthesia Plan  Type of Anesthesia, risks & benefits discussed:    Anesthesia Type: Gen Natural Airway  Intra-op Monitoring Plan: Standard ASA Monitors  Post Op Pain Control Plan: multimodal analgesia  Induction:  IV  Informed Consent: Informed consent signed with the Patient and all parties understand the risks and agree with anesthesia plan.  All questions answered.   ASA Score: 2  Day of Surgery Review of History & Physical: H&P Update referred to the surgeon/provider.    Ready For Surgery From Anesthesia Perspective.     .

## 2023-01-24 NOTE — TRANSFER OF CARE
"Anesthesia Transfer of Care Note    Patient: Vimal Keys    Procedure(s) Performed: Procedure(s) (LRB):  EGD (ESOPHAGOGASTRODUODENOSCOPY) (N/A)    Patient location: [facility]    Anesthesia Type: general    Transport from OR: Transported from OR on 2-3 L/min O2 by NC with adequate spontaneous ventilation    Post pain: adequate analgesia    Post assessment: no apparent anesthetic complications    Post vital signs: stable    Level of consciousness: awake and alert    Nausea/Vomiting: no nausea/vomiting    Complications: none    Transfer of care protocol was followed      Last vitals:   Visit Vitals  /72   Pulse 67   Temp 36.8 °C (98.2 °F)   Resp 15   Ht 6' 1" (1.854 m)   Wt 83.9 kg (185 lb)   SpO2 100%   BMI 24.41 kg/m²     "

## 2023-01-24 NOTE — PROVATION PATIENT INSTRUCTIONS
Discharge Summary/Instructions after an Endoscopic Procedure  Patient Name: Vimal Keys  Patient MRN: [id number]  Patient YOB: 1985 Tuesday, January 24, 2023  Cleio Keys MD  Dear patient,  As a result of recent federal legislation (The Federal Cures Act), you may   receive lab or pathology results from your procedure in your [facility]   account before your physician is able to contact you. Your physician or   their representative will relay the results to you with their   recommendations at their soonest availability.  Thank you,  RESTRICTIONS:  During your procedure today, you received medications for sedation.  These   medications may affect your judgment, balance and coordination.  Therefore,   for 24 hours, you have the following restrictions:   - DO NOT drive a car, operate machinery, make legal/financial decisions,   sign important papers or drink alcohol.    ACTIVITY:  Today: no heavy lifting, straining or running due to procedural   sedation/anesthesia.  The following day: return to full activity including work.  DIET:  Eat and drink normally unless instructed otherwise.     TREATMENT FOR COMMON SIDE EFFECTS:  - Mild abdominal pain, nausea, belching, bloating or excessive gas:  rest,   eat lightly and use a heating pad.  - Sore Throat: treat with throat lozenges and/or gargle with warm salt   water.  - Because air was used during the procedure, expelling large amounts of air   from your rectum or belching is normal.  - If a bowel prep was taken, you may not have a bowel movement for 1-3 days.    This is normal.  SYMPTOMS TO WATCH FOR AND REPORT TO YOUR PHYSICIAN:  1. Abdominal pain or bloating, other than gas cramps.  2. Chest pain.  3. Back pain.  4. Signs of infection such as: chills or fever occurring within 24 hours   after the procedure.  5. Rectal bleeding, which would show as bright red, maroon, or black stools.   (A tablespoon of blood from the rectum is not serious, especially if    hemorrhoids are present.)  6. Vomiting.  7. Weakness or dizziness.  GO DIRECTLY TO THE NEAREST EMERGENCY ROOM IF YOU HAVE ANY OF THE FOLLOWING:      Difficulty breathing              Chills and/or fever over 101 F   Persistent vomiting and/or vomiting blood   Severe abdominal pain   Severe chest pain   Black, tarry stools   Bleeding- more than one tablespoon   Any other symptom or condition that you feel may need urgent attention  Your doctor recommends these additional instructions:  If any biopsies were taken, your doctors clinic will contact you in 1 to 2   weeks with any results.  - Patient has a contact number available for emergencies.  The signs and   symptoms of potential delayed complications were discussed with the   patient.  Return to normal activities tomorrow.  Written discharge   instructions were provided to the patient.   - Discharge patient to home (ambulatory).   - Resume previous diet.   - Continue present medications. will likely restart PPI  - Await pathology results.   - Return to GI clinic at appointment to be scheduled.  For questions, problems or results please call your physician - Celio Keys MD at Work:  [phone].  [facility], EMERGENCY ROOM PHONE NUMBER: [phone]  IF A COMPLICATION OR EMERGENCY SITUATION ARISES AND YOU ARE UNABLE TO REACH   YOUR PHYSICIAN - GO DIRECTLY TO THE EMERGENCY ROOM.  Celio Keys MD  1/24/2023 8:55:35 AM  This report has been verified and signed electronically.  Dear patient,  As a result of recent federal legislation (The Federal Cures Act), you may   receive lab or pathology results from your procedure in your [facility]   account before your physician is able to contact you. Your physician or   their representative will relay the results to you with their   recommendations at their soonest availability.  Thank you,  PROVATION

## 2023-01-24 NOTE — TELEPHONE ENCOUNTER
----- Message from Viviana Godoy LPN sent at 1/20/2023  3:50 PM CST -----  Regarding: FW: procedure  Contact: [phone]    ----- Message -----  From: Mario Dumont MA  Sent: 1/20/2023   3:27 PM CST  To: Ron GALVEZ Staff  Subject: procedure                                        Pt is calling to reschedule his procedure stated that he  tried to so it on the pt portal please call pt at  [phone]

## 2023-01-24 NOTE — TELEPHONE ENCOUNTER
Call placed to patient. Name and date of birth verified. Patient requesting to reschedule procedure. Procedure rescheduled and noted in epic. Patient informed appointment details are noted in patient portal. Patient verbalized understanding.

## 2023-01-25 ENCOUNTER — TELEPHONE (OUTPATIENT)
Dept: INTERNAL MEDICINE | Facility: CLINIC | Age: 38
End: 2023-01-25
Payer: COMMERCIAL

## 2023-01-25 NOTE — TELEPHONE ENCOUNTER
Spoke to Mr. Keys and informed him that it has been 3 years since visit with Dr. Matos.  Patient states that he will go into the portal to schedule appt.  Patient states that he did not have time to schedule appt,

## 2023-01-25 NOTE — TELEPHONE ENCOUNTER
----- Message from Haleigh Matos MD sent at 1/24/2023  4:29 PM CST -----  He was last seen 3 years ago.  Please schedule an in person appointment for an annual. Thanks!

## 2023-01-30 ENCOUNTER — PATIENT MESSAGE (OUTPATIENT)
Dept: GASTROENTEROLOGY | Facility: HOSPITAL | Age: 38
End: 2023-01-30
Payer: COMMERCIAL

## 2023-01-30 LAB
FINAL PATHOLOGIC DIAGNOSIS: NORMAL
GROSS: NORMAL
Lab: NORMAL

## 2023-02-09 RX ORDER — PANTOPRAZOLE SODIUM 40 MG/1
40 TABLET, DELAYED RELEASE ORAL DAILY
Qty: 90 TABLET | Refills: 3 | Status: SHIPPED | OUTPATIENT
Start: 2023-02-09 | End: 2024-02-09

## 2023-03-16 ENCOUNTER — TELEPHONE (OUTPATIENT)
Dept: UROLOGY | Facility: CLINIC | Age: 38
End: 2023-03-16
Payer: COMMERCIAL

## 2023-03-16 ENCOUNTER — PATIENT MESSAGE (OUTPATIENT)
Dept: UROLOGY | Facility: CLINIC | Age: 38
End: 2023-03-16
Payer: COMMERCIAL

## 2023-03-16 NOTE — TELEPHONE ENCOUNTER
Call placed to patient. No answer. Detailed message left regard arrival time for tomorrow at 1:15pm in the [facility] 2nd Floor. Call back number provided. Will continue to follow-up.   Portal message sent.

## 2023-03-17 ENCOUNTER — PROCEDURE VISIT (OUTPATIENT)
Dept: UROLOGY | Facility: CLINIC | Age: 38
End: 2023-03-17
Payer: COMMERCIAL

## 2023-03-17 VITALS
HEIGHT: 73 IN | RESPIRATION RATE: 16 BRPM | BODY MASS INDEX: 25.79 KG/M2 | WEIGHT: 194.56 LBS | DIASTOLIC BLOOD PRESSURE: 72 MMHG | TEMPERATURE: 97 F | SYSTOLIC BLOOD PRESSURE: 124 MMHG | HEART RATE: 70 BPM

## 2023-03-17 DIAGNOSIS — Z30.2 ENCOUNTER FOR MALE STERILIZATION PROCEDURE: ICD-10-CM

## 2023-03-17 PROCEDURE — 55250 REMOVAL OF SPERM DUCT(S): CPT | Mod: S$GLB,,, | Performed by: UROLOGY

## 2023-03-17 PROCEDURE — 55250 PR REMOVAL OF SPERM DUCT(S): ICD-10-PCS | Mod: S$GLB,,, | Performed by: UROLOGY

## 2023-03-17 RX ORDER — OXYCODONE AND ACETAMINOPHEN 5; 325 MG/1; MG/1
1 TABLET ORAL EVERY 4 HOURS PRN
Qty: 8 TABLET | Refills: 0 | Status: SHIPPED | OUTPATIENT
Start: 2023-03-17 | End: 2023-03-27

## 2023-03-17 RX ORDER — CEPHALEXIN 500 MG/1
500 CAPSULE ORAL 4 TIMES DAILY
Qty: 8 CAPSULE | Refills: 0 | Status: SHIPPED | OUTPATIENT
Start: 2023-03-17 | End: 2023-03-19

## 2023-03-17 RX ADMIN — LIDOCAINE HYDROCHLORIDE 20 ML: 10 INJECTION INFILTRATION; PERINEURAL at 02:03

## 2023-03-17 NOTE — PROCEDURES
Date: 03/17/2023     Pre procedure diagnosis: male sterilization    Post procedure diagnosis:same    Surgeon: Ron    Assistant: None    Procedure performed: Bilateral vasectomy    Blood loss: Min.    Specimen: None    Procedure in detail:  After informed consent was obtained, the patient was placed in the supine position.  The skin was sterilized with a prep.  Attention was then turned to the patient's left hemiscrotum.    The vas was isolated on this side.  Local anesthesia was applied with 1% lidocaine.  The skin overlying the vas was incised sharply.  The vas was then isolated and grasped with a ring forceps.  It was brought through the incision.  The adventia overlying the vas was incised sharply.  The vas was then doubly clamped with a hemostat.  The vas was divided between the two hemostats with a 15 blade scalpel.    The ends of the vas were cauterized and tied with 2-0 silk sutures.  Two sutures were placed on each side. Electrocautery was used to obtain hemostasis.  A fascial interposition was then done with a 3-0 chromic.    The wound was then closed with a 3-0 chromic.    Attention was then turned to the right hemiscrotum and the exact same procedure was done. The vas was isolated on this side.  Local anesthesia was applied with 1% lidocaine.  The skin overlying the vas was incised sharply.  The vas was then isolated and grasped with a ring forceps.  It was brought through the incision.  The adventia overlying the vas was incised sharply.  The vas was then doubly clamped with a hemostat.  The vas was divided between the two hemostats with a 15 blade scalpel.    The ends of the vas were cauterized and tied with 2-0 silk sutures.  Two sutures were placed on each side. Electrocautery was used to obtain hemostasis.  A fascial interposition was then done with a 3-0 chromic.    The wound was then closed with a 3-0 chromic.    He tolerated the procedure well without complication.  He was advised to continue  contraception until he brings in 2 semen samples that show no sperm.  He is also to avoid lifting, strenuous exercise, intercourse, NSAIDS, and ASA for 1 week.  He will be discharged home is stable condition.  He is to follow up prn.

## 2023-03-17 NOTE — PATIENT INSTRUCTIONS
What to Expect After a Vasectomy  You cannot drive or operate heavy machinery on the day of the procedure. Begin prescription antibiotics today and take as directed until finished. Dr. Velasquez will give you a prescription for a narcotic pain medication. You may choose to take the prescription medication or Tylenol only for minor discomfort. Do not take any NSAIDS (e.g., Motrin, Naprosyn, etc.) or aspirin for at least one week, as these products can thin the blood.    Apply ice packs to the scrotal area for 24-48 hours. Avoid direct contact of the ice pack with the skin. Scrotal supports, jock straps, or fitted underwear help elevate the scrotum and reduce discomfort.    You may shower the next day. Gently apply soapy water to the scrotum to wash. Rinse and dry yourself by blotting the skin, not rubbing.    Avoid strenuous physical exercises or sexual relations for at least one week after a vasectomy.    Continue to use birth control for at least 6-8 weeks or 20 ejaculations. You are still considered fertile until your urologist examines a post-vasectomy semen analysis after 20 ejaculations and a second one a few days after the first. Drop off the specimens at the 4th floor [facility] department between 8am and 4pm.    Do NOT resume unprotected sexual activity until your physician finds no sperm in your semen.    All stitches will dissolve on their own in 1-2 weeks.    Signs and Symptoms to Report  A large amount of bleeding at the site.  An unusual amount of pain.  A large amount of swelling in the scrotum.  Fever and chills.  Any signs of infection, such as redness at the site or foul-smelling discharge    Risks  The risks of complication after vasectomy are very low.    A few of the risks include:  Bleeding.  Infection.  Scrotal hematoma - a collection of blood in the scrotum.  Inflammation of the epididymis - inflammation of a structure next to the testicle that helps in maturation of the sperm.  Sperm  granuloma - a collection of sperm that leaks out from the vas deferens, forming a small nodule or lump. This does not usually cause any discomfort, but you may feel it in the scrotum.  Recanalization - the restoration of the lumen or transport tube between the two ends of the vas deferens, possibly causing fertility.    If you have any questions or concerns, please call Dr. Velasquez at [phone] during regular office hours. If there are any problems after hours or on weekends, you may call [phone] and ask for the urology resident on call.

## 2023-04-25 ENCOUNTER — NURSE TRIAGE (OUTPATIENT)
Dept: ADMINISTRATIVE | Facility: CLINIC | Age: 38
End: 2023-04-25
Payer: COMMERCIAL

## 2023-04-25 ENCOUNTER — PATIENT MESSAGE (OUTPATIENT)
Dept: UROLOGY | Facility: CLINIC | Age: 38
End: 2023-04-25
Payer: COMMERCIAL

## 2023-04-25 ENCOUNTER — TELEPHONE (OUTPATIENT)
Dept: UROLOGY | Facility: CLINIC | Age: 38
End: 2023-04-25
Payer: COMMERCIAL

## 2023-04-25 NOTE — TELEPHONE ENCOUNTER
Call placed to patient. Name and date of birth verified. Patient provided post-vas instructions with drop off in the [facility] 2nd Floor. Patient verbalized understanding.

## 2023-04-25 NOTE — TELEPHONE ENCOUNTER
Patient is one month post vasectomy. He has a specimen that he needs to drop off. Patient states that his paperwork states to drop off on the 4th floor but he was told to drop off on the 2nd floor. Attempted to reach out to the office staff. Spoke with Gonzalo Craft LPN via secure chat. States that patient should bring his specimen to the 4th floor. Patient wants to know if he needs to bring to the 4th floor of [address] [place] [facility] or [facility] [place]. Will route message to the office to f/u with the patient. Patient VU. Advised the patient to call back with any further questions.    Reason for Disposition   General information question, no triage required and triager able to answer question    Protocols used: Information Only Call - No Triage-A-

## 2023-05-03 ENCOUNTER — TELEPHONE (OUTPATIENT)
Dept: UROLOGY | Facility: CLINIC | Age: 38
End: 2023-05-03
Payer: COMMERCIAL

## 2023-05-03 NOTE — TELEPHONE ENCOUNTER
He was informed that his recent post vas specimen was clear of sperm.  None were identified under microscopic review.   This was sample #2.   He is cleared.

## 2023-07-20 ENCOUNTER — PATIENT MESSAGE (OUTPATIENT)
Dept: UROLOGY | Facility: CLINIC | Age: 38
End: 2023-07-20
Payer: COMMERCIAL

## 2024-08-13 ENCOUNTER — OFFICE VISIT (OUTPATIENT)
Dept: FAMILY MEDICINE | Facility: CLINIC | Age: 39
End: 2024-08-13
Payer: COMMERCIAL

## 2024-08-13 VITALS
HEIGHT: 73 IN | TEMPERATURE: 98 F | OXYGEN SATURATION: 98 % | WEIGHT: 196 LBS | BODY MASS INDEX: 25.98 KG/M2 | DIASTOLIC BLOOD PRESSURE: 72 MMHG | HEART RATE: 65 BPM | SYSTOLIC BLOOD PRESSURE: 116 MMHG

## 2024-08-13 DIAGNOSIS — Z12.5 PROSTATE CANCER SCREENING: ICD-10-CM

## 2024-08-13 DIAGNOSIS — Z00.00 WELL ADULT EXAM: Primary | ICD-10-CM

## 2024-08-13 PROCEDURE — 3074F SYST BP LT 130 MM HG: CPT | Mod: CPTII,S$GLB,, | Performed by: FAMILY MEDICINE

## 2024-08-13 PROCEDURE — 1160F RVW MEDS BY RX/DR IN RCRD: CPT | Mod: CPTII,S$GLB,, | Performed by: FAMILY MEDICINE

## 2024-08-13 PROCEDURE — 3078F DIAST BP <80 MM HG: CPT | Mod: CPTII,S$GLB,, | Performed by: FAMILY MEDICINE

## 2024-08-13 PROCEDURE — 99386 PREV VISIT NEW AGE 40-64: CPT | Mod: S$GLB,,, | Performed by: FAMILY MEDICINE

## 2024-08-13 PROCEDURE — 3008F BODY MASS INDEX DOCD: CPT | Mod: CPTII,S$GLB,, | Performed by: FAMILY MEDICINE

## 2024-08-13 PROCEDURE — 1159F MED LIST DOCD IN RCRD: CPT | Mod: CPTII,S$GLB,, | Performed by: FAMILY MEDICINE

## 2024-08-13 PROCEDURE — 99999 PR PBB SHADOW E&M-EST. PATIENT-LVL III: CPT | Mod: PBBFAC,,, | Performed by: FAMILY MEDICINE

## 2024-08-13 NOTE — PROGRESS NOTES
"Subjective:       Patient ID: Vimal Keys is a 38 y.o. male.    Chief Complaint: Establish Care    Here today to establish care with a new PCP.       Patient here today for annual well adult exam.   Tries to eat a healthy diet.  Exercises regularly.    Immunizations: Tdap 2019  Last Lab Work: 2016  Colon Ca screening:  Due at 45  Prostate Ca Screening: Due at 48         Review of Systems   Constitutional:  Negative for activity change, appetite change, fatigue and fever.   HENT:  Negative for congestion, hearing loss, sneezing and sore throat.    Eyes:  Negative for pain, discharge and visual disturbance.   Respiratory:  Negative for cough, shortness of breath and wheezing.    Cardiovascular:  Negative for chest pain and palpitations.   Gastrointestinal:  Negative for abdominal pain, blood in stool, constipation, diarrhea, nausea and vomiting.   Genitourinary:  Negative for difficulty urinating, dysuria, frequency and hematuria.   Musculoskeletal:  Negative for arthralgias, joint swelling and myalgias.   Skin:  Negative for rash.   Neurological:  Negative for dizziness, syncope, weakness and headaches.   Psychiatric/Behavioral:  Negative for agitation, behavioral problems, confusion and suicidal ideas. The patient is not nervous/anxious.        Objective:      Vitals:    08/13/24 1304   BP: 116/72   Pulse: 65   Temp: 98.1 °F (36.7 °C)   SpO2: 98%   Weight: 88.9 kg (195 lb 15.8 oz)   Height: 6' 1" (1.854 m)      Physical Exam  Constitutional:       General: He is not in acute distress.  Cardiovascular:      Rate and Rhythm: Normal rate and regular rhythm.      Heart sounds: Normal heart sounds. No murmur heard.  Pulmonary:      Effort: Pulmonary effort is normal. No respiratory distress.      Breath sounds: Normal breath sounds. No wheezing, rhonchi or rales.   Musculoskeletal:         General: No swelling.   Skin:     General: Skin is warm and dry.   Neurological:      General: No focal deficit present.    "   Mental Status: He is alert.   Psychiatric:         Mood and Affect: Mood normal.         Behavior: Behavior normal.         Thought Content: Thought content normal.            Assessment:       1. Well adult exam    2. Prostate cancer screening        Plan:       Well adult exam  -     CBC Auto Differential; Future; Expected date: 08/13/2024  -     Comprehensive Metabolic Panel; Future; Expected date: 08/13/2024  -     Hemoglobin A1C; Future; Expected date: 08/13/2024  -     Lipid Panel; Future; Expected date: 08/13/2024  -     TSH; Future; Expected date: 08/13/2024  -     Urinalysis, Reflex to Urine Culture Urine, Clean Catch; Future; Expected date: 08/13/2024  -     Vitamin D 25-Hydroxy; Future; Expected date: 08/13/2024  Continue to work on dietary improvements (decrease overall calorie intake, decrease sugar and carb intake, decrease animal protein intake)  Continue to exercise at least 30-40 minutes, 3 times per week  Immunizations were discussed and were up to date  Preventative exams were discussed and up to date .  We did discussed possible Cologuard (would pay out of pocket cost) and possible CT calcium score.  He will continue to think about it and let me know.    Prostate cancer screening  -     PSA, SCREENING; Future; Expected date: 08/13/2024          Medication List with Changes/Refills   Discontinued Medications    PANTOPRAZOLE (PROTONIX) 40 MG TABLET    Take 1 tablet (40 mg total) by mouth once daily.

## 2024-08-20 ENCOUNTER — LAB VISIT (OUTPATIENT)
Dept: LAB | Facility: HOSPITAL | Age: 39
End: 2024-08-20
Attending: FAMILY MEDICINE
Payer: COMMERCIAL

## 2024-08-20 DIAGNOSIS — Z12.5 PROSTATE CANCER SCREENING: ICD-10-CM

## 2024-08-20 DIAGNOSIS — Z00.00 WELL ADULT EXAM: ICD-10-CM

## 2024-08-20 LAB
25(OH)D3+25(OH)D2 SERPL-MCNC: 31 NG/ML (ref 30–96)
ALBUMIN SERPL BCP-MCNC: 4.4 G/DL (ref 3.5–5.2)
ALP SERPL-CCNC: 49 U/L (ref 55–135)
ALT SERPL W/O P-5'-P-CCNC: 18 U/L (ref 10–44)
ANION GAP SERPL CALC-SCNC: 8 MMOL/L (ref 8–16)
AST SERPL-CCNC: 21 U/L (ref 10–40)
BASOPHILS # BLD AUTO: 0.04 K/UL (ref 0–0.2)
BASOPHILS NFR BLD: 0.7 % (ref 0–1.9)
BILIRUB SERPL-MCNC: 0.6 MG/DL (ref 0.1–1)
BUN SERPL-MCNC: 17 MG/DL (ref 6–20)
CALCIUM SERPL-MCNC: 9.4 MG/DL (ref 8.7–10.5)
CHLORIDE SERPL-SCNC: 104 MMOL/L (ref 95–110)
CHOLEST SERPL-MCNC: 146 MG/DL (ref 120–199)
CHOLEST/HDLC SERPL: 3.6 {RATIO} (ref 2–5)
CO2 SERPL-SCNC: 26 MMOL/L (ref 23–29)
COMPLEXED PSA SERPL-MCNC: 0.19 NG/ML (ref 0–4)
CREAT SERPL-MCNC: 1.2 MG/DL (ref 0.5–1.4)
DIFFERENTIAL METHOD BLD: ABNORMAL
EOSINOPHIL # BLD AUTO: 0.2 K/UL (ref 0–0.5)
EOSINOPHIL NFR BLD: 4.2 % (ref 0–8)
ERYTHROCYTE [DISTWIDTH] IN BLOOD BY AUTOMATED COUNT: 11.9 % (ref 11.5–14.5)
EST. GFR  (NO RACE VARIABLE): >60 ML/MIN/1.73 M^2
ESTIMATED AVG GLUCOSE: 111 MG/DL (ref 68–131)
GLUCOSE SERPL-MCNC: 94 MG/DL (ref 70–110)
HBA1C MFR BLD: 5.5 % (ref 4–5.6)
HCT VFR BLD AUTO: 44.8 % (ref 40–54)
HDLC SERPL-MCNC: 41 MG/DL (ref 40–75)
HDLC SERPL: 28.1 % (ref 20–50)
HGB BLD-MCNC: 14.9 G/DL (ref 14–18)
IMM GRANULOCYTES # BLD AUTO: 0.02 K/UL (ref 0–0.04)
IMM GRANULOCYTES NFR BLD AUTO: 0.3 % (ref 0–0.5)
LDLC SERPL CALC-MCNC: 92 MG/DL (ref 63–159)
LYMPHOCYTES # BLD AUTO: 2.1 K/UL (ref 1–4.8)
LYMPHOCYTES NFR BLD: 36.8 % (ref 18–48)
MCH RBC QN AUTO: 31.1 PG (ref 27–31)
MCHC RBC AUTO-ENTMCNC: 33.3 G/DL (ref 32–36)
MCV RBC AUTO: 94 FL (ref 82–98)
MONOCYTES # BLD AUTO: 0.6 K/UL (ref 0.3–1)
MONOCYTES NFR BLD: 9.6 % (ref 4–15)
NEUTROPHILS # BLD AUTO: 2.8 K/UL (ref 1.8–7.7)
NEUTROPHILS NFR BLD: 48.4 % (ref 38–73)
NONHDLC SERPL-MCNC: 105 MG/DL
NRBC BLD-RTO: 0 /100 WBC
PLATELET # BLD AUTO: 225 K/UL (ref 150–450)
PMV BLD AUTO: 12.3 FL (ref 9.2–12.9)
POTASSIUM SERPL-SCNC: 4.1 MMOL/L (ref 3.5–5.1)
PROT SERPL-MCNC: 7.2 G/DL (ref 6–8.4)
RBC # BLD AUTO: 4.79 M/UL (ref 4.6–6.2)
SODIUM SERPL-SCNC: 138 MMOL/L (ref 136–145)
TRIGL SERPL-MCNC: 65 MG/DL (ref 30–150)
TSH SERPL DL<=0.005 MIU/L-ACNC: 1.45 UIU/ML (ref 0.4–4)
WBC # BLD AUTO: 5.74 K/UL (ref 3.9–12.7)

## 2024-08-20 PROCEDURE — 82306 VITAMIN D 25 HYDROXY: CPT | Performed by: FAMILY MEDICINE

## 2024-08-20 PROCEDURE — 36415 COLL VENOUS BLD VENIPUNCTURE: CPT | Mod: PO | Performed by: FAMILY MEDICINE

## 2024-08-20 PROCEDURE — 84153 ASSAY OF PSA TOTAL: CPT | Performed by: FAMILY MEDICINE

## 2024-08-20 PROCEDURE — 80061 LIPID PANEL: CPT | Performed by: FAMILY MEDICINE

## 2024-08-20 PROCEDURE — 85025 COMPLETE CBC W/AUTO DIFF WBC: CPT | Performed by: FAMILY MEDICINE

## 2024-08-20 PROCEDURE — 84443 ASSAY THYROID STIM HORMONE: CPT | Performed by: FAMILY MEDICINE

## 2024-08-20 PROCEDURE — 83036 HEMOGLOBIN GLYCOSYLATED A1C: CPT | Performed by: FAMILY MEDICINE

## 2024-08-20 PROCEDURE — 80053 COMPREHEN METABOLIC PANEL: CPT | Performed by: FAMILY MEDICINE

## 2024-09-04 ENCOUNTER — TELEPHONE (OUTPATIENT)
Dept: PSYCHIATRY | Facility: CLINIC | Age: 39
End: 2024-09-04
Payer: COMMERCIAL

## 2024-09-23 ENCOUNTER — OFFICE VISIT (OUTPATIENT)
Dept: PSYCHIATRY | Facility: CLINIC | Age: 39
End: 2024-09-23
Payer: COMMERCIAL

## 2024-09-23 DIAGNOSIS — F43.0 ACUTE STRESS REACTION: Primary | ICD-10-CM

## 2024-09-23 PROCEDURE — 3044F HG A1C LEVEL LT 7.0%: CPT | Mod: CPTII,S$GLB,, | Performed by: PSYCHOLOGIST

## 2024-09-23 PROCEDURE — 90791 PSYCH DIAGNOSTIC EVALUATION: CPT | Mod: S$GLB,,, | Performed by: PSYCHOLOGIST

## 2024-09-23 NOTE — PROGRESS NOTES
Outpatient Psychiatry Initial Visit  09/25/2024    History of Presenting Illness:  Pt is a 39-yr-old male, who self-referred for support for trauma of sudden death of a friend in his home..  Patient was seen, examined and chart was reviewed. Patient reviewed and agreed to informed consent and limits of confidentiality.    Provided pt support for trauma of sudden death of an acquaintance in his home. Helped pt identify feelings of sadness and anger. He identified stoicism as a useful coping philosophy for giving meaning to this trauma and putting the experience into perspective. Discussed his reaction of self-blame and normalized this reaction in context of trauma. Also discussed the typical trajectory of trauma and discussed ways of increasing emotional processing through exposure and conversation with wife. Pt reported he struggled emotionally in the first two weeks, but was able to go to work and care for his children. He denied significant distress about a month after the trauma. He denied need for ongoing therapy and agreed to schedule f/u as needed.    Current Stressors: Traumatic death of a friend in his home    Clinical Assessment:   Identified symptoms to address in tx: Acute stress    Psychiatric Symptoms Review    Depression Symptoms: Pt denied. He reported some crying spells and self-blame in first two weeks after traumatic death of friend in his home.     Anxiety Symptoms: Pt denied.    Rhea Symptoms: Pt denied current or history of related symptoms.    Psychosis Symptoms: Pt denied current or history of related symptoms.    Attention/Concentration Symptoms: Pt denied current or history of related symptoms.    Disordered Eating/Body Image Concerns: Pt denied current or history of related symptoms.    Suicidal Ideation and Risk: Pt denied current or history of related symptoms.    Access to gun: unknown    Homicidal/Violent Ideation and Risk: Pt denied current or history of related symptoms.    Prior  Mental Health Treatment:    Prior psychotherapy: None.    Prior Psychiatric Hospitalizations: Pt denied.     Current psychiatric medication: None.    Family Psychiatric History: Did not assess    Current Medical Conditions Per Chart Review:   Patient Active Problem List   Diagnosis    GERD (gastroesophageal reflux disease)    Childhood asthma    Tinnitus of both ears    Basal cell carcinoma (BCC) of anterior chest    Vitreous floaters of both eyes        SOCIAL HISTORY    Relationships and Support Network:  Pt reported positive relationship with wife and children. Wife works as a pediatric gastroenterologist. Pt has strong social support and local friends. He noted that he typically prefers family time over socializing with adult peers.    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    Abuse History:  Did not assess.    Other trauma: Pt denied.    -----------------------------------------------------    Clinical Assessment:     Summary     Diagnosis(es):   1) Acute stress reaction    Ability to adhere to plan:  Cooperative    Rationale for employing these interactive techniques: Applicable to diagnosis    Plan      Goal #1: Learn coping skills for trauma.  Goal #2: Learn ways of supporting wife in her trauma reaction.    This author reviewed limits to confidentiality. Pt indicated understanding and denied any questions.    Return to Clinic: He denied significant distress about a month after the trauma. He denied need for ongoing therapy and agreed to schedule f/u as needed.    -Spent 60 min face to face with the pt; 50 minutes counseling  -Conducted diagnostic interview  -Pt instructed to call clinic, 911 or go to nearest emergency room if sxs worsen or pt is in   crisis. The pt expresses understanding.    Each patient to whom he or she provides medical services by telemedicine is:  (1) informed of the relationship between the physician and patient and the respective role of any  "other health care provider with respect to management of the patient; and (2) notified that he or she may decline to receive medical services by telemedicine and may withdraw from such care at any time.         Mental Status Exam      Appearance: unremarkable  Grooming: appropriate to situation  Arousal: alert, awake  Behavior/Cooperation:Cooperative  Speech: normal tone, normal rate, normal pitch, normal volume  Language: appropriate english vocabulary  Mood: "good"; somewhat restricted  Affect: Euthymic, congruent to mood; tearful at times  Thought Process: Normal and logical  Thought Content: : normal, no suicidality, no homicidality, delusions, or paranoia  Associations: no loose associations noted  Orientation: Grossly intact  Attention Span/Concentration: intact  Insight: good  Judgment: good    General: age appropriate, well nourished, casually dressed, neatly groomed  "

## 2025-04-23 ENCOUNTER — DOCUMENTATION ONLY (OUTPATIENT)
Dept: FAMILY MEDICINE | Facility: CLINIC | Age: 40
End: 2025-04-23
Payer: COMMERCIAL

## 2025-04-28 ENCOUNTER — PATIENT OUTREACH (OUTPATIENT)
Dept: ADMINISTRATIVE | Facility: HOSPITAL | Age: 40
End: 2025-04-28
Payer: COMMERCIAL

## 2025-08-19 ENCOUNTER — OFFICE VISIT (OUTPATIENT)
Dept: FAMILY MEDICINE | Facility: CLINIC | Age: 40
End: 2025-08-19
Payer: COMMERCIAL

## 2025-08-19 ENCOUNTER — PATIENT MESSAGE (OUTPATIENT)
Dept: ADMINISTRATIVE | Facility: HOSPITAL | Age: 40
End: 2025-08-19
Payer: COMMERCIAL

## 2025-08-19 VITALS
HEIGHT: 73 IN | SYSTOLIC BLOOD PRESSURE: 101 MMHG | HEART RATE: 72 BPM | TEMPERATURE: 98 F | RESPIRATION RATE: 18 BRPM | WEIGHT: 198.44 LBS | OXYGEN SATURATION: 96 % | DIASTOLIC BLOOD PRESSURE: 76 MMHG | BODY MASS INDEX: 26.3 KG/M2

## 2025-08-19 DIAGNOSIS — Z13.6 ENCOUNTER FOR SCREENING FOR CARDIOVASCULAR DISORDERS: ICD-10-CM

## 2025-08-19 DIAGNOSIS — Z00.00 WELL ADULT EXAM: Primary | ICD-10-CM

## 2025-08-19 DIAGNOSIS — Z12.5 PROSTATE CANCER SCREENING: ICD-10-CM

## 2025-08-19 DIAGNOSIS — K20.0 EOSINOPHILIC ESOPHAGITIS: ICD-10-CM

## 2025-08-19 PROBLEM — Z80.0 FAMILY HISTORY OF COLON CANCER IN FATHER: Status: ACTIVE | Noted: 2025-08-19

## 2025-08-19 PROCEDURE — 1159F MED LIST DOCD IN RCRD: CPT | Mod: CPTII,S$GLB,, | Performed by: FAMILY MEDICINE

## 2025-08-19 PROCEDURE — 3008F BODY MASS INDEX DOCD: CPT | Mod: CPTII,S$GLB,, | Performed by: FAMILY MEDICINE

## 2025-08-19 PROCEDURE — 1160F RVW MEDS BY RX/DR IN RCRD: CPT | Mod: CPTII,S$GLB,, | Performed by: FAMILY MEDICINE

## 2025-08-19 PROCEDURE — 3078F DIAST BP <80 MM HG: CPT | Mod: CPTII,S$GLB,, | Performed by: FAMILY MEDICINE

## 2025-08-19 PROCEDURE — 99396 PREV VISIT EST AGE 40-64: CPT | Mod: S$GLB,,, | Performed by: FAMILY MEDICINE

## 2025-08-19 PROCEDURE — 99999 PR PBB SHADOW E&M-EST. PATIENT-LVL III: CPT | Mod: PBBFAC,,, | Performed by: FAMILY MEDICINE

## 2025-08-19 PROCEDURE — 3074F SYST BP LT 130 MM HG: CPT | Mod: CPTII,S$GLB,, | Performed by: FAMILY MEDICINE

## 2025-08-19 RX ORDER — FINASTERIDE 1 MG/1
1 TABLET, FILM COATED ORAL DAILY
COMMUNITY

## 2025-08-19 RX ORDER — MINOXIDIL 2.5 MG/1
2.5 TABLET ORAL DAILY
COMMUNITY

## 2025-08-19 RX ORDER — PANTOPRAZOLE SODIUM 40 MG/1
40 TABLET, DELAYED RELEASE ORAL EVERY MORNING
COMMUNITY
Start: 2025-08-18

## 2025-08-26 ENCOUNTER — HOSPITAL ENCOUNTER (OUTPATIENT)
Dept: RADIOLOGY | Facility: HOSPITAL | Age: 40
Discharge: HOME | End: 2025-08-26
Attending: FAMILY MEDICINE
Payer: COMMERCIAL

## 2025-08-26 DIAGNOSIS — Z13.6 ENCOUNTER FOR SCREENING FOR CARDIOVASCULAR DISORDERS: ICD-10-CM

## 2025-08-26 PROCEDURE — 75571 CT HRT W/O DYE W/CA TEST: CPT | Mod: TC,PO

## 2025-08-26 PROCEDURE — 75571 CT HRT W/O DYE W/CA TEST: CPT | Mod: 26,,, | Performed by: RADIOLOGY
